# Patient Record
Sex: FEMALE | Race: WHITE | NOT HISPANIC OR LATINO | URBAN - METROPOLITAN AREA
[De-identification: names, ages, dates, MRNs, and addresses within clinical notes are randomized per-mention and may not be internally consistent; named-entity substitution may affect disease eponyms.]

---
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
## 2024-01-09 ENCOUNTER — APPOINTMENT (OUTPATIENT)
Dept: OBGYN | Facility: CLINIC | Age: 19
End: 2024-01-09
Payer: COMMERCIAL

## 2024-01-09 VITALS
DIASTOLIC BLOOD PRESSURE: 70 MMHG | SYSTOLIC BLOOD PRESSURE: 114 MMHG | WEIGHT: 134 LBS | BODY MASS INDEX: 27.01 KG/M2 | HEIGHT: 59 IN

## 2024-01-09 DIAGNOSIS — Z78.9 OTHER SPECIFIED HEALTH STATUS: ICD-10-CM

## 2024-01-09 DIAGNOSIS — Z11.3 ENCOUNTER FOR SCREENING FOR INFECTIONS WITH A PREDOMINANTLY SEXUAL MODE OF TRANSMISSION: ICD-10-CM

## 2024-01-09 PROCEDURE — 99202 OFFICE O/P NEW SF 15 MIN: CPT

## 2024-01-09 RX ORDER — LEVONORGESTREL AND ETHINYL ESTRADIOL 0.1-0.02MG
0.1-2 KIT ORAL
Qty: 3 | Refills: 3 | Status: ACTIVE | COMMUNITY
Start: 2024-01-09 | End: 1900-01-01

## 2024-01-11 PROBLEM — Z78.9 PATIENT DENIES MEDICAL PROBLEMS: Status: ACTIVE | Noted: 2024-01-11

## 2024-01-11 LAB
C TRACH RRNA SPEC QL NAA+PROBE: NOT DETECTED
N GONORRHOEA RRNA SPEC QL NAA+PROBE: NOT DETECTED
SOURCE AMPLIFICATION: NORMAL

## 2024-01-11 NOTE — PHYSICAL EXAM
[Chaperone Present] : A chaperone was present in the examining room during all aspects of the physical examination [Appropriately responsive] : appropriately responsive [Alert] : alert [No Acute Distress] : no acute distress [Soft] : soft [Non-tender] : non-tender [Non-distended] : non-distended [No Lesions] : no lesions [No Mass] : no mass [Oriented x3] : oriented x3

## 2024-01-11 NOTE — HISTORY OF PRESENT ILLNESS
[FreeTextEntry1] : 17 y/o G 0 female, LMP: 1/6/23, presents as new patient w mother to discuss OCP.  Patient denies any GYN complaints.   Menstrual Cycle: regular, monthly, mild pain and bleeding. menarche: age 8 lifetime partners: 1 Sexual Activity: monogamous with BF. Contraception: OCP, condoms Social/Mental Health: reports social ETOH, denies tobacco or any illicit drug use.  Denies depression, anxiety, thoughts of personal harm or suicidal ideation.  ROS:  Denies fever/chills, HA, Cough/sore throat, CP, SOB, N/V, Diarrhea/Constipation, Pelvic pain, Urinary frequency/urgency/incontinence, irregular vaginal bleeding, discharge or irritation.    Medical History GYN HX: denies PMH: denies PSH: denies Meds: none Allergies: NKDA fam hx: denies [Normal Amount/Duration] :  normal amount and duration [Patient refuses STI testing] : Patient refuses STI testing

## 2024-01-11 NOTE — PLAN
[FreeTextEntry1] : Discussed OCP R/B/A Patient denies DVT, PE, HTN, DM, overall healthy-OCP rx sent..  RTO in 1 year or sooner for any gyn issues. Patient verbalized understanding.

## 2024-05-31 ENCOUNTER — APPOINTMENT (OUTPATIENT)
Dept: OBGYN | Facility: CLINIC | Age: 19
End: 2024-05-31
Payer: COMMERCIAL

## 2024-05-31 VITALS
HEIGHT: 59 IN | WEIGHT: 129 LBS | DIASTOLIC BLOOD PRESSURE: 75 MMHG | BODY MASS INDEX: 26 KG/M2 | SYSTOLIC BLOOD PRESSURE: 117 MMHG

## 2024-05-31 DIAGNOSIS — Z30.09 ENCOUNTER FOR OTHER GENERAL COUNSELING AND ADVICE ON CONTRACEPTION: ICD-10-CM

## 2024-05-31 LAB — HCG UR QL: NEGATIVE

## 2024-05-31 PROCEDURE — 99212 OFFICE O/P EST SF 10 MIN: CPT

## 2024-05-31 PROCEDURE — 81025 URINE PREGNANCY TEST: CPT

## 2024-05-31 RX ORDER — NORGESTIMATE AND ETHINYL ESTRADIOL 7DAYSX3 LO
0.18/0.215/0.25 KIT ORAL
Qty: 3 | Refills: 3 | Status: ACTIVE | COMMUNITY
Start: 2024-05-31 | End: 1900-01-01

## 2024-05-31 NOTE — PHYSICAL EXAM
[Chaperone Declined] : Patient declined chaperone [Appropriately responsive] : appropriately responsive [Alert] : alert [No Acute Distress] : no acute distress [Soft] : soft [Non-tender] : non-tender [Non-distended] : non-distended [No Lesions] : no lesions [No Mass] : no mass [Oriented x3] : oriented x3

## 2024-05-31 NOTE — HISTORY OF PRESENT ILLNESS
[FreeTextEntry1] : 19 y/o G 0 female, LMP: 1/6/23, presents for f/u to discuss OCP side effects.  Has been on Aviane for past 5 mths and c/o feeling like a "zombie ,"emotions I use to feel I don't feel anymore".   also c/o BTB on OCP.    Menstrual Cycle: regular, monthly, mild pain and bleeding. menarche: age 8 lifetime partners: 1 Sexual Activity: monogamous with BF. Contraception: OCP, condoms Social/Mental Health: reports social ETOH, denies tobacco or any illicit drug use. h/o anxiety was on zolof.  Denies depression, thoughts of personal harm or suicidal ideation.  ROS: Denies fever/chills, HA, Cough/sore throat, CP, SOB, N/V, Diarrhea/Constipation, Pelvic pain, Urinary frequency/urgency/incontinence, irregular vaginal bleeding, discharge or irritation.  Medical History GYN HX: denies PMH: ADHD PSH: denies Meds: Adderall Allergies: NKDA fam hx: denies [Normal Amount/Duration] :  normal amount and duration [Spotting Between  Menses] : spotting reported between menses [Patient refuses STI testing] : Patient refuses STI testing

## 2024-05-31 NOTE — DISCUSSION/SUMMARY
[FreeTextEntry1] : Discussed switching monophasic to triphasic OCP discussed OCP r/b/a advised to f/u w mental health provider for h/o ADHD. patient verbalized understanding.

## 2024-07-01 ENCOUNTER — APPOINTMENT (OUTPATIENT)
Dept: OBGYN | Facility: CLINIC | Age: 19
End: 2024-07-01

## 2024-07-20 ENCOUNTER — NON-APPOINTMENT (OUTPATIENT)
Age: 19
End: 2024-07-20

## 2024-07-24 ENCOUNTER — APPOINTMENT (OUTPATIENT)
Dept: OBGYN | Facility: CLINIC | Age: 19
End: 2024-07-24
Payer: COMMERCIAL

## 2024-07-24 VITALS
HEIGHT: 59 IN | SYSTOLIC BLOOD PRESSURE: 127 MMHG | DIASTOLIC BLOOD PRESSURE: 85 MMHG | WEIGHT: 123 LBS | BODY MASS INDEX: 24.8 KG/M2

## 2024-07-24 DIAGNOSIS — Z30.09 ENCOUNTER FOR OTHER GENERAL COUNSELING AND ADVICE ON CONTRACEPTION: ICD-10-CM

## 2024-07-24 PROCEDURE — 99202 OFFICE O/P NEW SF 15 MIN: CPT

## 2024-07-24 PROCEDURE — 36415 COLL VENOUS BLD VENIPUNCTURE: CPT

## 2024-07-24 NOTE — HISTORY OF PRESENT ILLNESS
[FreeTextEntry1] : pt presents for c/o heavy withdrawal bleeding since change to new ocp a few months ago, denies dizziness or light headedness, [facility]

## 2024-07-24 NOTE — PLAN
[FreeTextEntry1] : if ucg and blood work normal to monitor x 2 months if continues to call for change of ocp  bleeding precautions reviewed

## 2024-07-25 LAB
HCT VFR BLD CALC: 40.8 %
HGB BLD-MCNC: 13.4 G/DL
MCHC RBC-ENTMCNC: 30 PG
MCHC RBC-ENTMCNC: 32.8 GM/DL
MCV RBC AUTO: 91.3 FL
PLATELET # BLD AUTO: 228 K/UL
RBC # BLD: 4.47 M/UL
RBC # FLD: 13.2 %
TSH SERPL-ACNC: 2.34 UIU/ML
WBC # FLD AUTO: 7.28 K/UL

## 2025-01-14 ENCOUNTER — APPOINTMENT (OUTPATIENT)
Dept: OBGYN | Facility: CLINIC | Age: 20
End: 2025-01-14
Payer: COMMERCIAL

## 2025-01-14 VITALS
WEIGHT: 138 LBS | HEIGHT: 59 IN | DIASTOLIC BLOOD PRESSURE: 72 MMHG | BODY MASS INDEX: 27.82 KG/M2 | SYSTOLIC BLOOD PRESSURE: 105 MMHG

## 2025-01-14 DIAGNOSIS — Z01.419 ENCOUNTER FOR GYNECOLOGICAL EXAMINATION (GENERAL) (ROUTINE) W/OUT ABNORMAL FINDINGS: ICD-10-CM

## 2025-01-14 DIAGNOSIS — Z11.3 ENCOUNTER FOR SCREENING FOR INFECTIONS WITH A PREDOMINANTLY SEXUAL MODE OF TRANSMISSION: ICD-10-CM

## 2025-01-14 DIAGNOSIS — Z30.09 ENCOUNTER FOR OTHER GENERAL COUNSELING AND ADVICE ON CONTRACEPTION: ICD-10-CM

## 2025-01-14 PROCEDURE — 99459 PELVIC EXAMINATION: CPT

## 2025-01-14 PROCEDURE — 99395 PREV VISIT EST AGE 18-39: CPT

## 2025-01-21 LAB
C TRACH RRNA SPEC QL NAA+PROBE: NOT DETECTED
CYTOLOGY CVX/VAG DOC THIN PREP: NORMAL
N GONORRHOEA RRNA SPEC QL NAA+PROBE: NOT DETECTED
SOURCE AMPLIFICATION: NORMAL

## 2025-03-22 ENCOUNTER — NON-APPOINTMENT (OUTPATIENT)
Age: 20
End: 2025-03-22

## 2025-07-31 ENCOUNTER — APPOINTMENT (OUTPATIENT)
Dept: OBGYN | Facility: CLINIC | Age: 20
End: 2025-07-31